# Patient Record
Sex: FEMALE | NOT HISPANIC OR LATINO | Employment: FULL TIME | URBAN - METROPOLITAN AREA
[De-identification: names, ages, dates, MRNs, and addresses within clinical notes are randomized per-mention and may not be internally consistent; named-entity substitution may affect disease eponyms.]

---

## 2018-04-19 ENCOUNTER — HOSPITAL ENCOUNTER (EMERGENCY)
Facility: HOSPITAL | Age: 58
Discharge: HOME | End: 2018-04-19
Attending: EMERGENCY MEDICINE | Admitting: EMERGENCY MEDICINE

## 2018-04-19 ENCOUNTER — APPOINTMENT (OUTPATIENT)
Dept: CT IMAGING | Facility: HOSPITAL | Age: 58
End: 2018-04-19

## 2018-04-19 VITALS
TEMPERATURE: 97.8 F | SYSTOLIC BLOOD PRESSURE: 147 MMHG | DIASTOLIC BLOOD PRESSURE: 83 MMHG | WEIGHT: 184 LBS | HEIGHT: 63 IN | RESPIRATION RATE: 15 BRPM | BODY MASS INDEX: 32.6 KG/M2 | HEART RATE: 90 BPM | OXYGEN SATURATION: 100 %

## 2018-04-19 DIAGNOSIS — S39.011A STRAIN OF ABDOMINAL WALL, INITIAL ENCOUNTER: Primary | ICD-10-CM

## 2018-04-19 DIAGNOSIS — N28.9 RENAL LESION: ICD-10-CM

## 2018-04-19 LAB
ALBUMIN SERPL-MCNC: 4.5 G/DL (ref 3.5–5.2)
ALBUMIN/GLOB SERPL: 1.5 G/DL
ALP SERPL-CCNC: 80 U/L (ref 39–117)
ALT SERPL W P-5'-P-CCNC: 14 U/L (ref 1–33)
ANION GAP SERPL CALCULATED.3IONS-SCNC: 11.8 MMOL/L
AST SERPL-CCNC: 18 U/L (ref 1–32)
BACTERIA UR QL AUTO: ABNORMAL /HPF
BASOPHILS # BLD AUTO: 0.04 10*3/MM3 (ref 0–0.2)
BASOPHILS NFR BLD AUTO: 0.8 % (ref 0–1.5)
BILIRUB SERPL-MCNC: 0.8 MG/DL (ref 0.1–1.2)
BILIRUB UR QL STRIP: NEGATIVE
BUN BLD-MCNC: 16 MG/DL (ref 6–20)
BUN/CREAT SERPL: 26.7 (ref 7–25)
CALCIUM SPEC-SCNC: 9 MG/DL (ref 8.6–10.5)
CHLORIDE SERPL-SCNC: 103 MMOL/L (ref 98–107)
CLARITY UR: CLEAR
CO2 SERPL-SCNC: 25.2 MMOL/L (ref 22–29)
COLOR UR: YELLOW
CREAT BLD-MCNC: 0.6 MG/DL (ref 0.57–1)
DEPRECATED RDW RBC AUTO: 44.4 FL (ref 37–54)
EOSINOPHIL # BLD AUTO: 0.03 10*3/MM3 (ref 0–0.7)
EOSINOPHIL NFR BLD AUTO: 0.6 % (ref 0.3–6.2)
ERYTHROCYTE [DISTWIDTH] IN BLOOD BY AUTOMATED COUNT: 13.4 % (ref 11.7–13)
GFR SERPL CREATININE-BSD FRML MDRD: 103 ML/MIN/1.73
GLOBULIN UR ELPH-MCNC: 3.1 GM/DL
GLUCOSE BLD-MCNC: 102 MG/DL (ref 65–99)
GLUCOSE UR STRIP-MCNC: NEGATIVE MG/DL
HCT VFR BLD AUTO: 43.1 % (ref 35.6–45.5)
HGB BLD-MCNC: 14.1 G/DL (ref 11.9–15.5)
HGB UR QL STRIP.AUTO: NEGATIVE
HYALINE CASTS UR QL AUTO: ABNORMAL /LPF
IMM GRANULOCYTES # BLD: 0.02 10*3/MM3 (ref 0–0.03)
IMM GRANULOCYTES NFR BLD: 0.4 % (ref 0–0.5)
KETONES UR QL STRIP: ABNORMAL
LEUKOCYTE ESTERASE UR QL STRIP.AUTO: ABNORMAL
LIPASE SERPL-CCNC: 32 U/L (ref 13–60)
LYMPHOCYTES # BLD AUTO: 1.09 10*3/MM3 (ref 0.9–4.8)
LYMPHOCYTES NFR BLD AUTO: 21.2 % (ref 19.6–45.3)
MCH RBC QN AUTO: 29.7 PG (ref 26.9–32)
MCHC RBC AUTO-ENTMCNC: 32.7 G/DL (ref 32.4–36.3)
MCV RBC AUTO: 90.9 FL (ref 80.5–98.2)
MONOCYTES # BLD AUTO: 0.37 10*3/MM3 (ref 0.2–1.2)
MONOCYTES NFR BLD AUTO: 7.2 % (ref 5–12)
NEUTROPHILS # BLD AUTO: 3.58 10*3/MM3 (ref 1.9–8.1)
NEUTROPHILS NFR BLD AUTO: 69.8 % (ref 42.7–76)
NITRITE UR QL STRIP: NEGATIVE
PH UR STRIP.AUTO: 7.5 [PH] (ref 5–8)
PLATELET # BLD AUTO: 329 10*3/MM3 (ref 140–500)
PMV BLD AUTO: 9.4 FL (ref 6–12)
POTASSIUM BLD-SCNC: 4 MMOL/L (ref 3.5–5.2)
PROT SERPL-MCNC: 7.6 G/DL (ref 6–8.5)
PROT UR QL STRIP: NEGATIVE
RBC # BLD AUTO: 4.74 10*6/MM3 (ref 3.9–5.2)
RBC # UR: ABNORMAL /HPF
REF LAB TEST METHOD: ABNORMAL
SODIUM BLD-SCNC: 140 MMOL/L (ref 136–145)
SP GR UR STRIP: 1.02 (ref 1–1.03)
SQUAMOUS #/AREA URNS HPF: ABNORMAL /HPF
UROBILINOGEN UR QL STRIP: ABNORMAL
WBC NRBC COR # BLD: 5.13 10*3/MM3 (ref 4.5–10.7)
WBC UR QL AUTO: ABNORMAL /HPF

## 2018-04-19 PROCEDURE — 25010000002 IOPAMIDOL 61 % SOLUTION: Performed by: EMERGENCY MEDICINE

## 2018-04-19 PROCEDURE — 96374 THER/PROPH/DIAG INJ IV PUSH: CPT

## 2018-04-19 PROCEDURE — 25010000002 MORPHINE PER 10 MG: Performed by: NURSE PRACTITIONER

## 2018-04-19 PROCEDURE — 80053 COMPREHEN METABOLIC PANEL: CPT | Performed by: NURSE PRACTITIONER

## 2018-04-19 PROCEDURE — 85025 COMPLETE CBC W/AUTO DIFF WBC: CPT | Performed by: NURSE PRACTITIONER

## 2018-04-19 PROCEDURE — 74177 CT ABD & PELVIS W/CONTRAST: CPT

## 2018-04-19 PROCEDURE — 99283 EMERGENCY DEPT VISIT LOW MDM: CPT

## 2018-04-19 PROCEDURE — 25010000002 ONDANSETRON PER 1 MG: Performed by: NURSE PRACTITIONER

## 2018-04-19 PROCEDURE — 96375 TX/PRO/DX INJ NEW DRUG ADDON: CPT

## 2018-04-19 PROCEDURE — 81001 URINALYSIS AUTO W/SCOPE: CPT | Performed by: NURSE PRACTITIONER

## 2018-04-19 PROCEDURE — 83690 ASSAY OF LIPASE: CPT | Performed by: NURSE PRACTITIONER

## 2018-04-19 RX ORDER — ONDANSETRON 2 MG/ML
4 INJECTION INTRAMUSCULAR; INTRAVENOUS ONCE
Status: COMPLETED | OUTPATIENT
Start: 2018-04-19 | End: 2018-04-19

## 2018-04-19 RX ORDER — SODIUM CHLORIDE 0.9 % (FLUSH) 0.9 %
10 SYRINGE (ML) INJECTION AS NEEDED
Status: DISCONTINUED | OUTPATIENT
Start: 2018-04-19 | End: 2018-04-19 | Stop reason: HOSPADM

## 2018-04-19 RX ADMIN — MORPHINE SULFATE 4 MG: 4 INJECTION, SOLUTION INTRAMUSCULAR; INTRAVENOUS at 12:10

## 2018-04-19 RX ADMIN — IOPAMIDOL 85 ML: 612 INJECTION, SOLUTION INTRAVENOUS at 12:54

## 2018-04-19 RX ADMIN — ONDANSETRON 4 MG: 2 INJECTION, SOLUTION INTRAMUSCULAR; INTRAVENOUS at 12:11

## 2018-04-19 NOTE — DISCHARGE INSTRUCTIONS
Ibuprofen as needed for pain  Rest, increase fluids  Follow up as per workman's comp if symptoms not improving  Return to er for fever, chills, vomiting, diarrhea, worsening pain or any new or worsening symptoms

## 2018-04-19 NOTE — ED PROVIDER NOTES
EMERGENCY DEPARTMENT ENCOUNTER    CHIEF COMPLAINT  Chief Complaint: ***  History given by: ***  History limited by: ***  Room Number: 34/34  PMD: No Known Provider      HPI:  Pt is a 57 y.o. female who presents complaining of ***    Duration/Onset/Timing: ***  Location: ***  Radiation: ***  Quality: ***  Intensity/Severity: ***  Associated Symptoms: ***  Aggravating or Alleviating Factors: ***  Previous Episodes: ***      PAST MEDICAL HISTORY  Active Ambulatory Problems     Diagnosis Date Noted   • No Active Ambulatory Problems     Resolved Ambulatory Problems     Diagnosis Date Noted   • No Resolved Ambulatory Problems     No Additional Past Medical History       PAST SURGICAL HISTORY  Past Surgical History:   Procedure Laterality Date   • RECTAL SURGERY         FAMILY HISTORY  History reviewed. No pertinent family history.    SOCIAL HISTORY  Social History     Social History   • Marital status:      Spouse name: N/A   • Number of children: N/A   • Years of education: N/A     Occupational History   • Not on file.     Social History Main Topics   • Smoking status: Never Smoker   • Smokeless tobacco: Never Used   • Alcohol use Yes      Comment: occ   • Drug use: No   • Sexual activity: No     Other Topics Concern   • Not on file     Social History Narrative   • No narrative on file       ALLERGIES  Penicillins    REVIEW OF SYSTEMS  Review of Systems    PHYSICAL EXAM  ED Triage Vitals   Temp Heart Rate Resp BP SpO2   04/19/18 1111 04/19/18 1111 04/19/18 1111 04/19/18 1122 04/19/18 1111   97.8 °F (36.6 °C) 90 15 159/93 100 %      Temp src Heart Rate Source Patient Position BP Location FiO2 (%)   04/19/18 1111 04/19/18 1111 04/19/18 1122 -- --   Tympanic Monitor Sitting         Physical Exam    LAB RESULTS  Lab Results (last 24 hours)     Procedure Component Value Units Date/Time    CBC & Differential [[id number]] Collected:  04/19/18 1158    Specimen:  Blood Updated:  04/19/18 1214    Narrative:       The  following orders were created for panel order CBC & Differential.  Procedure                               Abnormality         Status                     ---------                               -----------         ------                     CBC Auto Differential[[id number]]        Abnormal            Final result                 Please view results for these tests on the individual orders.    CBC Auto Differential [[id number]]  (Abnormal) Collected:  04/19/18 1158    Specimen:  Blood Updated:  04/19/18 1214     WBC 5.13 10*3/mm3      RBC 4.74 10*6/mm3      Hemoglobin 14.1 g/dL      Hematocrit 43.1 %      MCV 90.9 fL      MCH 29.7 pg      MCHC 32.7 g/dL      RDW 13.4 (H) %      RDW-SD 44.4 fl      MPV 9.4 fL      Platelets 329 10*3/mm3      Neutrophil % 69.8 %      Lymphocyte % 21.2 %      Monocyte % 7.2 %      Eosinophil % 0.6 %      Basophil % 0.8 %      Immature Grans % 0.4 %      Neutrophils, Absolute 3.58 10*3/mm3      Lymphocytes, Absolute 1.09 10*3/mm3      Monocytes, Absolute 0.37 10*3/mm3      Eosinophils, Absolute 0.03 10*3/mm3      Basophils, Absolute 0.04 10*3/mm3      Immature Grans, Absolute 0.02 10*3/mm3     Comprehensive Metabolic Panel [[id number]] Collected:  04/19/18 1159    Specimen:  Blood Updated:  04/19/18 1205    Lipase [[id number]] Collected:  04/19/18 1159    Specimen:  Blood Updated:  04/19/18 1205          I ordered the above labs and reviewed the results    RADIOLOGY  CT Abdomen Pelvis With Contrast    (Results Pending)        I ordered the above noted radiological studies. Interpreted by radiologist. Discussed with radiologist (***). Reviewed by me in PACS.       PROCEDURES  Procedures      PROGRESS AND CONSULTS  ED Course           MEDICAL DECISION MAKING  Results were reviewed/discussed with the patient and they were also made aware of online access. Pt also made aware that some labs, such as cultures, will not be resulted during ER visit and follow up with PMD is necessary.     MDM        DIAGNOSIS  Final diagnoses:   None       DISPOSITION  ***    Latest Documented Vital Signs:  As of 12:30 PM  BP- 159/93 HR- 90 Temp- 97.8 °F (36.6 °C) (Tympanic) O2 sat- 100%    --  Documentation assistance provided by scribe *** for ***.  Information recorded by the scribe was done at my direction and has been verified and validated by me.     Ga Guadarrama  04/19/18 5419

## 2018-04-19 NOTE — ED TRIAGE NOTES
Patient presents to ER via private vehicle from home.  Reports an occupational injury on 04/13.  Seen at [facility] and sent to ER for evaluation.  Patient reports lifting a heavy object and abrupt onset of left lower abdominal pain that is not affected by a cough.

## 2018-04-19 NOTE — ED PROVIDER NOTES
The JOSUE and I have discussed this patient's history, physical exam, and treatment plan. I have reviewed the documentation and personally had a face to face interaction with the patient  I affirm the documentation and agree with the treatment and plan.  The following describes my personal findings.    The patient presents complaining of LLQ abd pain after pushing a heavy cart at work 6 days ago. She reports her pain is worse with movement. She denies N/V, fever.      Limited physical exam:  Patient is nontoxic appearing  Lungs/cardiovascular: lungs CTAB, HRRR, no murmurs  Abdomen: Pt gestures to LLQ for pain, but there is no bulge/TTPalp. Back/extremities: PT and DP pulses intact. No edema    Labs and imaging reviewed.     Documentation assistance provided by asif Guadarrama.  Information recorded by the scribe was done at my direction and has been verified and validated by me.         Ga Guadarrama  04/19/18 9475       Marilyn Payne MD  04/21/18 5276

## 2018-04-19 NOTE — ED PROVIDER NOTES
EMERGENCY DEPARTMENT ENCOUNTER    CHIEF COMPLAINT  Chief Complaint: abdominal injury  History given by: patient, family  History limited by: N/A  Room Number: 34/34  PMD: No Known Provider      HPI:  Pt is a 57 y.o. female who presents with abdominal injury. She reports that about 6 days ago (on 4/13/18), while she was pushing a heavy object at work, she had onset of constant left lower abd pain that radiates to the left pelvis. It is exacerbated by walking, bending over, and movement and is alleviated by sitting down and remaining still. She denies LLE sensory loss, LLE motor loss, back pain, bladder dysfunction, bowel dysfunction, saddle anesthesia, pain and difficulty with urination, any other urinary sx, N/V/D, fevers, chills, and sustaining any other injury. She reports that she was seen at [facility] earlier today and was referred to ED for further evaluation. No significant Past Medical History.     Duration: occurred about 6 days ago  Timing: constant  Location: left lower abdomen   Radiation: left pelvis   Quality: pain  Intensity/Severity: moderate  Progression: unchanged  Associated Symptoms: none  Aggravating Factors: walking, bending over, movement  Alleviating Factors: sitting down, remaining still  Previous Episodes: none  Treatment before arrival: none mentioned    PAST MEDICAL HISTORY  Active Ambulatory Problems     Diagnosis Date Noted   • No Active Ambulatory Problems     Resolved Ambulatory Problems     Diagnosis Date Noted   • No Resolved Ambulatory Problems     No Additional Past Medical History       PAST SURGICAL HISTORY  Past Surgical History:   Procedure Laterality Date   • RECTAL SURGERY         FAMILY HISTORY  History reviewed. No pertinent family history.    SOCIAL HISTORY  Social History     Social History   • Marital status:      Spouse name: N/A   • Number of children: N/A   • Years of education: N/A     Occupational History   • Not on file.     Social History Main Topics    • Smoking status: Never Smoker   • Smokeless tobacco: Never Used   • Alcohol use Yes      Comment: occ   • Drug use: No   • Sexual activity: No     Other Topics Concern   • Not on file     Social History Narrative   • No narrative on file         ALLERGIES  Penicillins    REVIEW OF SYSTEMS  Review of Systems   Constitutional: Negative for chills and fever.   HENT: Negative for sore throat.    Respiratory: Negative for cough and shortness of breath.    Cardiovascular: Negative for chest pain.   Gastrointestinal: Positive for abdominal pain (left lower abd pain radiating to left pelvis). Negative for diarrhea, nausea and vomiting.   Genitourinary: Positive for pelvic pain (left lower abd pain radiating to left pelvis). Negative for difficulty urinating and dysuria.   Musculoskeletal: Negative for back pain.   Skin: Negative for rash.   Neurological: Negative for dizziness, weakness and numbness.   Psychiatric/Behavioral: The patient is not nervous/anxious.        PHYSICAL EXAM  ED Triage Vitals   Temp Heart Rate Resp BP SpO2   04/19/18 1111 04/19/18 1111 04/19/18 1111 04/19/18 1122 04/19/18 1111   97.8 °F (36.6 °C) 90 15 159/93 100 % WNL     Physical Exam   Constitutional: She is oriented to person, place, and time and well-developed, well-nourished, and in no distress.   HENT:   Head: Normocephalic.   Mouth/Throat: Mucous membranes are normal.   Eyes: EOM are normal. No scleral icterus.   Neck: Normal range of motion.   Cardiovascular: Normal rate, regular rhythm and normal heart sounds.    Pulmonary/Chest: Effort normal and breath sounds normal. No respiratory distress.   Abdominal: Soft. Bowel sounds are normal. There is tenderness in the left lower quadrant. There is no rebound and no guarding.   No protrusion to abdomen   Musculoskeletal: Normal range of motion.   Back is nontender   Neurological: She is alert and oriented to person, place, and time. She has normal motor skills and normal sensation.   Skin:  Skin is warm and dry.   Psychiatric: Mood and affect normal.   Nursing note and vitals reviewed.      LAB RESULTS  Recent Results (from the past 24 hour(s))   CBC Auto Differential    Collection Time: 04/19/18 11:58 AM   Result Value Ref Range    WBC 5.13 4.50 - 10.70 10*3/mm3    RBC 4.74 3.90 - 5.20 10*6/mm3    Hemoglobin 14.1 11.9 - 15.5 g/dL    Hematocrit 43.1 35.6 - 45.5 %    MCV 90.9 80.5 - 98.2 fL    MCH 29.7 26.9 - 32.0 pg    MCHC 32.7 32.4 - 36.3 g/dL    RDW 13.4 (H) 11.7 - 13.0 %    RDW-SD 44.4 37.0 - 54.0 fl    MPV 9.4 6.0 - 12.0 fL    Platelets 329 140 - 500 10*3/mm3    Neutrophil % 69.8 42.7 - 76.0 %    Lymphocyte % 21.2 19.6 - 45.3 %    Monocyte % 7.2 5.0 - 12.0 %    Eosinophil % 0.6 0.3 - 6.2 %    Basophil % 0.8 0.0 - 1.5 %    Immature Grans % 0.4 0.0 - 0.5 %    Neutrophils, Absolute 3.58 1.90 - 8.10 10*3/mm3    Lymphocytes, Absolute 1.09 0.90 - 4.80 10*3/mm3    Monocytes, Absolute 0.37 0.20 - 1.20 10*3/mm3    Eosinophils, Absolute 0.03 0.00 - 0.70 10*3/mm3    Basophils, Absolute 0.04 0.00 - 0.20 10*3/mm3    Immature Grans, Absolute 0.02 0.00 - 0.03 10*3/mm3   Comprehensive Metabolic Panel    Collection Time: 04/19/18 11:59 AM   Result Value Ref Range    Glucose 102 (H) 65 - 99 mg/dL    BUN 16 6 - 20 mg/dL    Creatinine 0.60 0.57 - 1.00 mg/dL    Sodium 140 136 - 145 mmol/L    Potassium 4.0 3.5 - 5.2 mmol/L    Chloride 103 98 - 107 mmol/L    CO2 25.2 22.0 - 29.0 mmol/L    Calcium 9.0 8.6 - 10.5 mg/dL    Total Protein 7.6 6.0 - 8.5 g/dL    Albumin 4.50 3.50 - 5.20 g/dL    ALT (SGPT) 14 1 - 33 U/L    AST (SGOT) 18 1 - 32 U/L    Alkaline Phosphatase 80 39 - 117 U/L    Total Bilirubin 0.8 0.1 - 1.2 mg/dL    eGFR Non African Amer 103 >60 mL/min/1.73    Globulin 3.1 gm/dL    A/G Ratio 1.5 g/dL    BUN/Creatinine Ratio 26.7 (H) 7.0 - 25.0    Anion Gap 11.8 mmol/L   Lipase    Collection Time: 04/19/18 11:59 AM   Result Value Ref Range    Lipase 32 13 - 60 U/L   Urinalysis With / Microscopic If Indicated - Urine,  Clean Catch    Collection Time: 04/19/18 12:40 PM   Result Value Ref Range    Color, UA Yellow Yellow, Straw    Appearance, UA Clear Clear    pH, UA 7.5 5.0 - 8.0    Specific Gravity, UA 1.016 1.005 - 1.030    Glucose, UA Negative Negative    Ketones, UA 15 mg/dL (1+) (A) Negative    Bilirubin, UA Negative Negative    Blood, UA Negative Negative    Protein, UA Negative Negative    Leuk Esterase, UA Moderate (2+) (A) Negative    Nitrite, UA Negative Negative    Urobilinogen, UA 0.2 E.U./dL 0.2 - 1.0 E.U./dL   Urinalysis, Microscopic Only - Urine, Clean Catch    Collection Time: 04/19/18 12:40 PM   Result Value Ref Range    RBC, UA 0-2 None Seen, 0-2 /HPF    WBC, UA 3-5 (A) None Seen, 0-2 /HPF    Bacteria, UA 4+ (A) None Seen /HPF    Squamous Epithelial Cells, UA 7-12 (A) None Seen, 0-2 /HPF    Hyaline Casts, UA 3-6 None Seen /LPF    Methodology Automated Microscopy        I ordered the above labs and reviewed the results    RADIOLOGY  CT Abdomen Pelvis With Contrast (Preliminary result) Result time: 04/19/18 13:27:20   Preliminary result by Ayanna Wilburn MD (04/19/18 13:27:20)   Impression:   1. There is mild diverticulosis at the descending and sigmoid colon  without convincing evidence for diverticulitis.  2. Follow-up is recommended for an 8 mm hyperdense right renal lesion.  The lesion may represent a proteinaceous cyst, but a solid enhancing  mass cannot be entirely excluded. Renal mass protocol CT of the abdomen  is recommended in 6 months.     Discussed with ARTHUR Lawrence.      Narrative:   CT ABDOMEN AND PELVIS WITH IV CONTRAST     HISTORY: 57-year-old female with left lower quadrant pain.     TECHNIQUE: Radiation dose reduction techniques were utilized, including  automated exposure control and exposure modulation based on body size.   3 mm images were obtained through the abdomen and pelvis after the  administration of IV contrast. There is no previous CT for comparison.     FINDINGS: The liver  appears unremarkable other than 2 cysts with the  larger measuring 2 cm. The gallbladder appears unremarkable and there is  no biliary dilatation. Splenic size is normal. The pancreas, adrenals,  and left kidney appear unremarkable. There is a slightly hyperdense 8 mm  partially exophytic lesion at the upper pole of the right kidney, image  51. The right kidney appears otherwise unremarkable. There is mild  diverticulosis throughout the descending and sigmoid colon. There is no  evidence for diverticulitis. The appendix appears normal. Uterus and  adnexa appear unremarkable. There is no free fluid or lymphadenopathy.       I ordered the above noted radiological studies and reviewed the images on the PACS system.  Spoke with Dr. Wilburn (radiologist) regarding CT abd/pel scan results        PROGRESS AND CONSULTS  1138- Ordered morphine and zofran for pain. Ordered CT abd/pel, lipase, blood work, and UA for further evaluation.   1227- Reviewed pt's history and workup with Dr. Payne.  At bedside evaluation, they agree with the plan of care.  1305- Obtained CT abd/pel results-> diverticulosis, no diverticulitis, 8mm hyperdense right renal lesion, no acute intraabdominal injury.   1342- Rechecked pt. She is resting comfortably and is in no acute distress. She states that her pain has improved after ED treatment. Reviewed implications of results (including normal WBC count, equivocal UA for UTI, CT abd/pel findings (diverticulosis, no diverticulitis, 8mm hyperdense right renal lesion, no acute intraabdominal injury)), diagnosis, meds, responsibility to follow up, warning signs and symptoms of possible worsening, potential complications and reasons to return to ER with patient.  Discussed all results and noted any abnormalities with patient.  Discussed the importance of and the absolute need to follow up with referred [facility] physician for recheck of condition and reevaluation of abnormalities (including right renal lesion seen  "on CT abd/pel (recommended repeat CT abd/pel in 6 months)). Informed pt that her sx could possibly be due to abd wall strain. Advised pt to take ibuprofen for pain, to rest, and to well hydrate.  Discussed plan for discharge, as there is no emergent indication for admission.  Pt is agreeable and understands need for follow up and repeat testing.  Pt is aware that discharge does not mean that nothing is wrong but it indicates no emergency is present.  Pt is discharged with instructions to follow up with primary care doctor to have their blood pressure rechecked.   1344- Urine sample for UA appears contaminated and equivocal for UTI. Pt denies any urinary sx. Will await urine culture results and then treat accordingly.       DIAGNOSIS  Final diagnoses:   Strain of abdominal wall, initial encounter   Renal lesion (right kidney)       FOLLOW UP   PATIENT LIAISON [facility] [id number] [phone]  Schedule an appointment as soon as possible for a visit   If symptoms worsen      COURSE & MEDICAL DECISION MAKING  Pertinent Labs and Imaging studies that were ordered and reviewed are noted above.  Results were reviewed/discussed with the patient and family and they were also made aware of online assess.   Pt and family also made aware that some labs, such as cultures, will not be resulted during ER visit and follow up with PMD is necessary.     MEDICATIONS GIVEN IN ER  Medications   sodium chloride 0.9 % flush 10 mL (not administered)   morphine injection 4 mg (4 mg Intravenous Given 4/19/18 1210)   ondansetron (ZOFRAN) injection 4 mg (4 mg Intravenous Given 4/19/18 1211)   iopamidol (ISOVUE-300) 61 % injection 100 mL (85 mL Intravenous Given 4/19/18 1254)       /83   Pulse 90   Temp 97.8 °F (36.6 °C) (Tympanic)   Resp 15   Ht 160 cm (63\")   Wt 83.5 kg (184 lb)   SpO2 100%   BMI 32.59 kg/m²       I personally reviewed the past medical history, past surgical history, social history, family " history, current medications and allergies as they appear in this chart.  The scribe's note accurately reflects the work and decisions made by me.     Documentation assistance provided by asif White for JOSE F Lawrence on 4/19/2018 at 1:59 PM. Information recorded by the scribe was done at my direction and has been verified and validated by me.       Lili White  04/19/18 1410       ARTHUR Toussaint  04/19/18 1417

## 2019-01-10 ENCOUNTER — HOSPITAL ENCOUNTER (OUTPATIENT)
Dept: OTHER | Facility: HOSPITAL | Age: 59
Discharge: HOME | End: 2019-01-10
Attending: NURSE PRACTITIONER

## 2019-01-10 LAB
CHOLEST SERPL-MCNC: 207 MG/DL (ref 107–200)
CHOLEST/HDLC SERPL: 2.9 {RATIO} (ref 3–6)
GLUCOSE SERPL-MCNC: 103 MG/DL (ref 65–115)
HDLC SERPL-MCNC: 71 MG/DL (ref 40–60)
LDLC SERPL CALC-MCNC: 125 MG/DL (ref 70–100)
TRIGL SERPL-MCNC: 53 MG/DL (ref 40–150)
VLDLC SERPL-MCNC: 11 MG/DL (ref 5–37)

## 2019-01-11 LAB
EST. AVERAGE GLUCOSE BLD GHB EST-MCNC: 111 MG/DL
HBA1C MFR BLD: 5.5 % (ref 3.5–5.7)

## 2019-01-16 ENCOUNTER — HOSPITAL ENCOUNTER (OUTPATIENT)
Dept: OTHER | Facility: HOSPITAL | Age: 59
Discharge: HOME | End: 2019-01-16
Attending: NURSE PRACTITIONER

## 2019-01-16 ENCOUNTER — OFFICE VISIT CONVERTED (OUTPATIENT)
Dept: FAMILY MEDICINE CLINIC | Age: 59
End: 2019-01-16
Attending: NURSE PRACTITIONER

## 2019-01-16 LAB
ALBUMIN SERPL-MCNC: 4.3 G/DL (ref 3.5–5)
ALBUMIN/GLOB SERPL: 1.4 {RATIO} (ref 1.4–2.6)
ALP SERPL-CCNC: 87 U/L (ref 53–141)
ALT SERPL-CCNC: 10 U/L (ref 10–40)
AMYLASE SERPL-CCNC: 58 U/L (ref 30–110)
ANION GAP SERPL CALC-SCNC: 19 MMOL/L (ref 8–19)
APPEARANCE UR: CLEAR
AST SERPL-CCNC: 13 U/L (ref 15–50)
BILIRUB SERPL-MCNC: 0.39 MG/DL (ref 0.2–1.3)
BILIRUB UR QL: NEGATIVE
BUN SERPL-MCNC: 17 MG/DL (ref 5–25)
BUN/CREAT SERPL: 26 {RATIO} (ref 6–20)
CALCIUM SERPL-MCNC: 9.3 MG/DL (ref 8.7–10.4)
CHLORIDE SERPL-SCNC: 102 MMOL/L (ref 99–111)
COLOR UR: YELLOW
CONV BACTERIA: NEGATIVE
CONV CO2: 24 MMOL/L (ref 22–32)
CONV COLLECTION SOURCE (UA): ABNORMAL
CONV TOTAL PROTEIN: 7.3 G/DL (ref 6.3–8.2)
CONV UROBILINOGEN IN URINE BY AUTOMATED TEST STRIP: 0.2 {EHRLICHU}/DL (ref 0.1–1)
CREAT UR-MCNC: 0.65 MG/DL (ref 0.5–0.9)
ERYTHROCYTE [DISTWIDTH] IN BLOOD BY AUTOMATED COUNT: 11.7 % (ref 11.5–14.5)
GFR SERPLBLD BASED ON 1.73 SQ M-ARVRAT: >60 ML/MIN/{1.73_M2}
GLOBULIN UR ELPH-MCNC: 3 G/DL (ref 2–3.5)
GLUCOSE SERPL-MCNC: 98 MG/DL (ref 65–99)
GLUCOSE UR QL: NEGATIVE MG/DL
HBA1C MFR BLD: 13.2 G/DL (ref 12–16)
HCT VFR BLD AUTO: 39.2 % (ref 37–47)
HGB UR QL STRIP: ABNORMAL
KETONES UR QL STRIP: NEGATIVE MG/DL
LEUKOCYTE ESTERASE UR QL STRIP: NEGATIVE
LIPASE SERPL-CCNC: 31 U/L (ref 5–51)
MCH RBC QN AUTO: 29.7 PG (ref 27–31)
MCHC RBC AUTO-ENTMCNC: 33.7 G/DL (ref 33–37)
MCV RBC AUTO: 88.1 FL (ref 81–99)
NITRITE UR QL STRIP: NEGATIVE
OSMOLALITY SERPL CALC.SUM OF ELEC: 294 MOSM/KG (ref 273–304)
PH UR STRIP.AUTO: 6.5 [PH] (ref 5–8)
PLATELET # BLD AUTO: 363 10*3/UL (ref 130–400)
PMV BLD AUTO: 6.5 FL (ref 7.4–10.4)
POTASSIUM SERPL-SCNC: 4 MMOL/L (ref 3.5–5.3)
PROT UR QL: NEGATIVE MG/DL
RBC # BLD AUTO: 4.44 10*6/UL (ref 4.2–5.4)
RBC #/AREA URNS HPF: ABNORMAL /[HPF]
SODIUM SERPL-SCNC: 141 MMOL/L (ref 135–147)
SP GR UR: 1.02 (ref 1–1.03)
SQUAMOUS SPT QL MICRO: ABNORMAL /[HPF]
WBC # BLD AUTO: 7.32 10*3/UL (ref 4.8–10.8)
WBC #/AREA URNS HPF: ABNORMAL /[HPF]

## 2019-11-21 ENCOUNTER — HOSPITAL ENCOUNTER (OUTPATIENT)
Dept: OTHER | Facility: HOSPITAL | Age: 59
Discharge: HOME | End: 2019-11-21
Attending: FAMILY MEDICINE

## 2019-11-21 LAB
CHOLEST SERPL-MCNC: 191 MG/DL (ref 107–200)
CHOLEST/HDLC SERPL: 3.2 {RATIO} (ref 3–6)
GLUCOSE SERPL-MCNC: 95 MG/DL (ref 65–115)
HDLC SERPL-MCNC: 59 MG/DL (ref 40–60)
LDLC SERPL CALC-MCNC: 123 MG/DL (ref 70–100)
TRIGL SERPL-MCNC: 46 MG/DL (ref 40–150)
VLDLC SERPL-MCNC: 9 MG/DL (ref 5–37)

## 2020-01-22 ENCOUNTER — OFFICE VISIT CONVERTED (OUTPATIENT)
Dept: FAMILY MEDICINE CLINIC | Age: 60
End: 2020-01-22
Attending: FAMILY MEDICINE

## 2020-06-26 ENCOUNTER — OFFICE VISIT CONVERTED (OUTPATIENT)
Dept: FAMILY MEDICINE CLINIC | Age: 60
End: 2020-06-26
Attending: NURSE PRACTITIONER

## 2021-05-18 NOTE — PROGRESS NOTES
Mavis Andrews 1960     Office/Outpatient Visit    Visit Date: Wed, Jan 16, 2019 04:31 pm    Provider: Lily Briggs N.P. (Assistant: Sheri Brooks RN)    Location: [facility]        Electronically signed by Lily Briggs N.P. on  01/16/2019 05:25:38 PM                             SUBJECTIVE:        CC:     Ms. Andrews is a 58 year old White female.  Preventative Exam for work;         HPI:         Patient to be evaluated for health checkup.  Her last menstrual period was 11/22/2018.  She is not currently using any form of contraception.  She is current with her Td and influenza immunization.  Tobacco: She has never smoked.          PHQ-9 Depression Screening: Completed form scanned and in chart; Total Score 1 Alcohol Consumption Screening: Completed form scanned and in chart; Total Score 2         Abdominal pain, other specified site details; this is located primarily in the right lower quadrant and periumbilical area.  It began yesterday.  She characterizes it as aching and sharp.  She estimates that the frequency of pain is waxes and wanes.  Associated symptoms include nausea.  She denies, constipation, diarrhea, dysuria, fever or vomiting.      ROS:     CONSTITUTIONAL:  Negative for chills and fever.      EYES:  Negative for eye drainage and eye pain.      E/N/T:  Negative for ear pain and sore throat.      CARDIOVASCULAR:  Negative for chest pain and palpitations.      RESPIRATORY:  Negative for dyspnea and frequent wheezing.      GASTROINTESTINAL:  Positive for abdominal pain and nausea.   Negative for constipation, diarrhea or vomiting.      GENITOURINARY:  Negative for dysuria and frequent urination.      MUSCULOSKELETAL:  Negative for joint stiffness and myalgias.      INTEGUMENTARY/BREAST:  Negative for rash.      NEUROLOGICAL:  Negative for dizziness and headaches.      ENDOCRINE:  Negative for polydipsia and polyphagia.      PSYCHIATRIC:  Negative for depression and suicidal thoughts.           PMH/FMH/SH:     Last Reviewed on 2017 10:34 AM by Lily Briggs    Past Medical History:                 PAST MEDICAL HISTORY     UNREMARKABLE         PREVENTIVE HEALTH MAINTENANCE             COLORECTAL CANCER SCREENING: Up to date (colonoscopy q10y; sigmoidoscopy q5y; Cologuard q3y) was last done 2016; colonoscopy with normal results     MAMMOGRAM: was last done 2017 with normal results     PAP SMEAR: was last done 2017 with normal results         Surgical History:         rectocele;         Family History:     Father: ;  Coronary Artery Disease; Hypertension; CABG X 3     Mother: ;  Renal Failure ( on dialysis );  Type 2 Diabetes     Brother(s): Colon Cancer         Social History:     Occupation: [employer]     Marital Status:      Children: 1 child         Tobacco/Alcohol/Supplements:     Last Reviewed on 2017 12:22 PM by Yolie Sharif    Tobacco: She has never smoked.          Alcohol: Frequency: Just on weekends When she drinks, the average quantity of alcohol is 1-2 drinks.          Substance Abuse History:     Last Reviewed on 2017 10:34 AM by Lily Briggs        Mental Health History:     Last Reviewed on 2017 10:34 AM by Lily Briggs        Communicable Diseases (eg STDs):     Last Reviewed on 2017 10:34 AM by Lily Briggs            Current Problems:     Last Reviewed on 2017 10:34 AM by Lily Briggs    Screening for diabetes mellitus     Screening for lipoid disorders         Immunizations:     Fluzone (3 + years dose) 2016     Fluzone (3 + years dose) 10/2/2017     Adacel (Tdap) 2017         Allergies:     Last Reviewed on 2017 12:22 PM by Yolie Sharif    Penicillins:    Erythromycin Base:        Current Medications:     Last Reviewed on 2017 12:22 PM by Yolie Sharif    Advil 200mg Tablet 1 tab daily prn     Benadryl Allergy 25mg Capsules 1 tab daily PRN     Tylenol Extra Strength 500mg Tablet 1 tab daily PRN          OBJECTIVE:        Vitals:         Current: 1/16/2019 4:36:24 PM    Ht:  5 ft, 2.75 in;  Wt: 193 lbs;  WC: 41 inches;  BMI: 34.5    T: 97.5 F (oral);  BP: 136/80 mm Hg (right arm, sitting);  P: 78 bpm (right arm (BP Cuff), sitting)        Exams:     PHYSICAL EXAM:     GENERAL: well developed, well nourished;  no apparent distress;     EYES: PERRL, EOMI     E/N/T: EARS: external auditory canal normal;  bilateral TMs are normal;  NOSE: normal turbinates; no sinus tenderness; OROPHARYNX: oral mucosa is normal; posterior pharynx shows no exudate;     NECK: range of motion is normal; trachea is midline;     RESPIRATORY: normal respiratory rate and pattern with no distress; normal breath sounds with no rales, rhonchi, wheezes or rubs;     CARDIOVASCULAR: normal rate; rhythm is regular;     GASTROINTESTINAL: mild periumbilical tenderness;  normal bowel sounds; no organomegaly;     MUSCULOSKELETAL: normal gait; normal range of motion of all major muscle groups; no limb or joint pain with range of motion;     NEUROLOGIC: mental status: alert and oriented x 3; GROSSLY INTACT     PSYCHIATRIC: appropriate affect and demeanor;         ASSESSMENT           V70.0   Z00.00  Health checkup              DDx:     V79.0   Z13.89  Screening for depression              DDx:     789.09   R10.33  Abdominal pain, other specified site              DDx:         ORDERS:         Meds Prescribed:       Zofran ODT (Ondansetron HCl) 4mg Tablets, Orally Disintegrating 1 tab every 8 hours PRN for nausea/vomiting  #20 (Twenty) tablet(s) Refills: 0         Lab Orders:       [id number]  AMYS - [facility] Amylase, Serum  (Send-Out)         71634  BDCB2 - [facility] CBC w/o diff  (Send-Out)         50702  COMP - [facility] Comp. Metabolic Panel  (Send-Out)         94372  LIP - [facility] Lipase, Serum  (Send-Out)         23785  BDUAM - [facility] Urinalysis, automated, with micro  (Send-Out)           Other Orders:         Calculated BMI above the upper parameter and a follow-up plan  was documented in the medical record  (In-House)           Depression screen negative  (In-House)           Negative EtOH screen  (In-House)                   PLAN:          Health checkup Lab results reviewed with patient. Some elevation to cholesterol but low ASCVD risk score. Discussed heart healthy diet and regular exercise.         COUNSELING provided on: healthy eating habits and regular exercise.  MIPS Vaccines Flu and Pneumonia updated in Shot record     BMI Elevated - Follow-Up Plan: She was provided education on weight loss strategies     FOLLOW-UP: Schedule follow-up appointments on a p.r.n. basis. for Annual Checkup           Orders:         Calculated BMI above the upper parameter and a follow-up plan was documented in the medical record  (In-House)            Screening for depression     MIPS PHQ-9 Depression Screening Completed form scanned and in chart; Total Score 1 Negative alcohol screen           Orders:         Depression screen negative  (In-House)           Negative EtOH screen  (In-House)            Abdominal pain, other specified site No acute abdomen. Further recommendations to follow after labs. Advised to seek ER care immediately for worsening symptoms such as severe pain, coffee ground emesis, black tarry stools.     LABORATORY:  Labs ordered to be performed today include amylase, CBC W/O DIFF, Comprehensive metabolic panel, Lipase, and urinalysis with micro.      FOLLOW-UP: Schedule follow-up appointments on a p.r.n. basis. for after testing           Prescriptions:       Zofran ODT (Ondansetron HCl) 4mg Tablets, Orally Disintegrating 1 tab every 8 hours PRN for nausea/vomiting  #20 (Twenty) tablet(s) Refills: 0           Orders:       44561  AMYS - [facility] Amylase, Serum  (Send-Out)         82246  BDCB2 - [facility] CBC w/o diff  (Send-Out)         59495  COMP - [facility] Comp. Metabolic Panel  (Send-Out)         64000  LIP - [facility] Lipase, Serum  (Send-Out)         16479  BDUAM -  [facility] Urinalysis, automated, with micro  (Send-Out)               Patient Recommendations:        For  Health checkup:         Limit dietary intake of fat (especially saturated fat) and cholesterol.  Eat a variety of foods, including plenty of fruits, vegetables, and grain containg fiber, limit fat intake to 30% of total calories. Balance caloric intake with energy expended.    Maintaining regular physical activity is advised to help prevent heart disease, hypertension, diabetes, and obesity.  Schedule follow-up appointments as needed.          For  Abdominal pain, other specified site:     Schedule follow-up appointments as needed.              CHARGE CAPTURE           **Please note: ICD descriptions below are intended for billing purposes only and may not represent clinical diagnoses**        Primary Diagnosis:         V70.0 Health checkup            Z00.00    Encounter for general adult medical examination without abnormal findings              Orders:          49476   Preventive medicine, established patient, age 40-64 years  (In-House)                Calculated BMI above the upper parameter and a follow-up plan was documented in the medical record  (In-House)           V79.0 Screening for depression            Z13.89    Encounter for screening for other disorder              Orders:          43756 -25  Office/outpatient visit; established patient, level 3  (In-House)                Depression screen negative  (In-House)                Negative EtOH screen  (In-House)           789.09 Abdominal pain, other specified site            R10.33    Periumbilical pain

## 2021-05-18 NOTE — PROGRESS NOTES
Mavis Andrews  1960     Office/Outpatient Visit    Visit Date: Fri, Jun 26, 2020 08:27 am    Provider: Lily Briggs N.P. (Assistant: Sarah Beth MA)    Location: [facility]        Electronically signed by Lily Briggs N.P. on  06/26/2020 09:15:35 AM                             Subjective:        CC: Ms. Andrews is a 59 year old White female.  Patient presents today to discuss anxiety issues, also has complaints of tick bite around right rib area X 4 days;         HPI:           Ms. Andrews presents with anxiety disorder, unspecified.  Her symptom complex includes apprehension and crying spells.  The frequency symptoms is several times per week.  Apparent triggers include occupational stressors, she is still laid off and her insurance is being taken away, and her brother is being taken off life support today.  She is not currently being treated for anxiety.  Previous attempts at treatment have included Xanax for short term after prior situational stressors.  Also tried Prozac in the past but did not tolerate. Additionally, she removed a small tick from under her right breast and now itching and red and would like to have looked at.     ROS:     CONSTITUTIONAL:  Negative for chills and fever.      CARDIOVASCULAR:  Negative for chest pain and palpitations.      RESPIRATORY:  Negative for dyspnea and frequent wheezing.      GASTROINTESTINAL:  Negative for abdominal pain and vomiting.      INTEGUMENTARY/BREAST:  Positive for rash.      NEUROLOGICAL:  Negative for dizziness and headaches.      PSYCHIATRIC:  Positive for anxiety and feelings of stress.   Negative for suicidal thoughts.          Past Medical History / Family History / Social History:         Last Reviewed on 6/26/2020 08:39 AM by Lily Briggs    Past Medical History:                 PAST MEDICAL HISTORY         Osteopenia         PREVENTIVE HEALTH MAINTENANCE             BONE DENSITY: was last done 8/2/18 with the following  abnormality noted-- Osteopenia     COLORECTAL CANCER SCREENING: Up to date (colonoscopy q10y; sigmoidoscopy q5y; Cologuard q3y) was last done 7/15/16, Results are in chart; colonoscopy with the following abnormalities noted-- Diverticulosis; The next colonoscopy is due  ; Dr. Hood     MAMMOGRAM: Done within last 2 years and results in are chart was last done  with normal results ARTHUR Knox     PAP SMEAR: was last done  with normal results         Surgical History:         rectocele;         Family History:     Father: ;  Coronary Artery Disease; Hypertension; CABG X 3     Mother: ;  Renal Failure ( on dialysis );  Type 2 Diabetes     Brother(s): Colon Cancer         Social History:     Occupation: [employer] shift     Marital Status:      Children: 1 child         Tobacco/Alcohol/Supplements:     Last Reviewed on 2020 08:29 AM by Sarah Beth    Tobacco: She has never smoked.          Alcohol: Frequency: Just on weekends When she drinks, the average quantity of alcohol is 1-2 drinks.          Substance Abuse History:     Last Reviewed on 2020 08:23 PM by Shahid Cervantes    None         Mental Health History:     Last Reviewed on 2020 08:23 PM by Shahid Cervantes    NEGATIVE         Communicable Diseases (eg STDs):     Last Reviewed on 2020 08:23 PM by Shahid Cervantes    Reportable health conditions; NEGATIVE         Current Problems:     Last Reviewed on 2020 08:23 PM by Shahid Cervantes    Encounter for screening for diabetes mellitus    Encounter for screening for lipoid disorders    Encounter for screening for depression    Encounter for general adult medical examination without abnormal findings        Immunizations:     influenza, injectable, quadrivalent, preservative free 10/14/2019    Fluzone (3 + years dose) 2016    Fluzone (3 + years dose) 10/2/2017    Fluzone (3 + years dose) 10/1/2018    Adacel (Tdap) 2017        Allergies:      Last Reviewed on 6/26/2020 08:29 AM by Sarah Beth    Penicillins:      Erythromycin Base:          Current Medications:     Last Reviewed on 6/26/2020 08:29 AM by Sarah Beth    diclofenac sodium 75 mg oral tablet, delayed release (enteric coated) [take 1 tablet (75 mg) by oral route 1 times per day]    BenadryL 25 mg oral capsule [1 tab daily PRN]    Tylenol Extra Strength 500 mg oral tablet [1 tab daily PRN]        Objective:        Vitals:         Historical:     1/22/2020  BP:   132/72 mm Hg ( (right arm, , sitting, );) 1/22/2020  P:   76bpm ( (right arm (BP Cuff), , sitting, );) 1/22/2020  T:   98F ( (oral, );) 1/22/2020  Wt:   193lbs    Current: 6/26/2020 8:31:01 AM    Ht:  5 ft, 2.75 in;  Wt: 197.4 lbs;  BMI: 35.2T: 97.5 F (oral);  BP: 142/69 mm Hg (right arm, sitting);  P: 85 bpm (right arm (BP Cuff), sitting);  sCr: 0.65 mg/dL;  GFR: 103.75        Repeat:     8:31:33 AM  BP:   140/65mm Hg (left arm, sitting, HR: 76)     Exams:     PHYSICAL EXAM:     GENERAL: well developed, well nourished;  no apparent distress;     RESPIRATORY: normal respiratory rate and pattern with no distress; normal breath sounds with no rales, rhonchi, wheezes or rubs;     CARDIOVASCULAR: normal rate; rhythm is regular;     BREAST/INTEGUMENT: SKIN: no significant rashes or lesions; no suspicious moles; small area of erythema under right breast, healing, no drainage;     MUSCULOSKELETAL: normal gait;     NEUROLOGIC: mental status: alert and oriented x 3; GROSSLY INTACT     PSYCHIATRIC: affect/demeanor: tearful;  normal psychomotor function; normal speech pattern; normal thought and perception;         Lab/Test Results:         Urine temperature: Confirmed (06/26/2020),     All urine drug screen levels confirmed negative: yes (06/26/2020),     Date and time of last pill: new script/AS (06/26/2020),     Performed by: ML (06/26/2020),     Collection Time: 0850 (06/26/2020),             Assessment:         F41.9   Anxiety  disorder, unspecified       S20.96XA   Insect bite (nonvenomous) of unspecified parts of thorax, initial encounter           ORDERS:         Meds Prescribed:       [New Rx] ALPRAZolam 0.25 mg oral tablet [take 1 tablet (0.25 mg) by oral route every 8 hours as needed], #30 (thirty) tablets, Refills: 0 (zero)         Radiology/Test Orders:       3017F  Colorectal CA screen results documented and reviewed (PV)  (In-House)              Lab Orders:       [id number]  Drug test prsmv qual dir optical obs per day  (In-House)            APPTO  Appointment need  (In-House)              Other Orders:         Screening mammogram results documented  (Send-Out)                      Plan:         Anxiety disorder, unspecifiedXanax for short term use. Potential side effects discussed.     LABORATORY:  Labs ordered to be performed today include Drug screen.  MIPS Vaccines Flu and Pneumonia updated in Shot record Screening mammomgram done within last 2 years and results in are chart Colorectal Cancer Screening is up to date and the results are in the chart     FOLLOW-UP: Schedule a follow-up visit in 1 month.:Patient will call to schedule follow-up appointment Controlled substance documentation: Jhon reviewed; drug screen performed and appropriate; consent is reviewed and signed and on the chart.  She is aware of risk of addiction on this medication, understands that she will need to follow up for a review every 3 months and her medications will be adjusted or decreased as deemed appropriate at each visit.  No history of drug or alcohol abuse.  No concerns about diversion or abuse. She denies side effects related to the medication.  She is aware that she may be called in for pill counts.  The dosing of this medication will be reviewed on a regular basis and reduced if possible..  Ongoing use of a controlled substance is necessary for this patient to have a normal quality of life           Prescriptions:       [New Rx] ALPRAZolam  0.25 mg oral tablet [take 1 tablet (0.25 mg) by oral route every 8 hours as needed], #30 (thirty) tablets, Refills: 0 (zero)           Orders:       [id number]  Drug test prsmv qual dir optical obs per day  (In-House)              Screening mammogram results documented  (Send-Out)            3017F  Colorectal CA screen results documented and reviewed (PV)  (In-House)            APPTO  Appointment need  (In-House)              Insect bite (nonvenomous) of unspecified parts of thorax, initial encounterDiscussed clean, healing. May take antihistamine such as allegra or bendryl for itching per package instructions. Monitor for worsening s/s such as uncontrolled fever, Bulls eye rash, joint pain and RTC as needed.             Patient Recommendations:        For  Anxiety disorder, unspecified:    Schedule a follow-up visit in 1 month.              Charge Capture:         Primary Diagnosis:     F41.9  Anxiety disorder, unspecified           Orders:      30439  Office/outpatient visit; established patient, level 4  (In-House)            53204  Drug test prsmv qual dir optical obs per day  (In-House)            3017F  Colorectal CA screen results documented and reviewed (PV)  (In-House)            APPTO  Appointment need  (In-House)              S20.96XA  Insect bite (nonvenomous) of unspecified parts of thorax, initial encounter

## 2021-05-18 NOTE — PROGRESS NOTES
Mavis Andrews  1960     Office/Outpatient Visit    Visit Date: Wed, Jan 22, 2020 03:49 pm    Provider: Shahid Cervantes MD (Assistant: Spurling, Sarah C, MA)    Location: [facility]        Electronically signed by Shahid Cervantes MD on  01/28/2020 08:24:04 PM                             Subjective:        CC: Ms. Andrews is a 59 year old White female.  prevenative exam;         HPI:           Encounter for general adult medical examination without abnormal findings noted.  Her last physical exam was 1 year ago.  She is not currently using any form of contraception.  She performs breast self-exams occasionally.  She is not current with her influenza immunization.  Tobacco: She has never smoked.            PHQ-9 Depression Screening: Completed form scanned and in chart; Total Score 2     ROS:     CONSTITUTIONAL:  Negative for chills, fatigue, fever, and weight change.      EYES:  Negative for blurred vision.      E/N/T:  Negative for ear pain and tinnitus.      CARDIOVASCULAR:  Negative for chest pain, dizziness, palpitations and edema.      RESPIRATORY:  Negative for dyspnea and cough.      GASTROINTESTINAL:  Negative for abdominal pain, diarrhea, heartburn, nausea and vomiting.      GENITOURINARY:  Negative for dysuria, hematuria and polyuria.      MUSCULOSKELETAL:  Negative for arthralgias and myalgias.      INTEGUMENTARY/BREAST:  Negative for rash, breast mass and skin changes of breast.      NEUROLOGICAL:  Negative for headaches, paresthesias and weakness.      ENDOCRINE:  Negative for hair loss, heat/cold intolerance, polydipsia, and polyphagia.      PSYCHIATRIC:  Negative for anxiety, depression, and sleep disturbances.          Past Medical History / Family History / Social History:         Last Reviewed on 1/28/2020 08:23 PM by Shahid Cervantes    Past Medical History:                 PAST MEDICAL HISTORY         Osteopenia         PREVENTIVE HEALTH MAINTENANCE             BONE DENSITY: was  last done 18 with the following abnormality noted-- Osteopenia     COLORECTAL CANCER SCREENING: Up to date (colonoscopy q10y; sigmoidoscopy q5y; Cologuard q3y) was last done 7/15/16, Results are in chart; colonoscopy with the following abnormalities noted-- Diverticulosis; The next colonoscopy is due  ; Dr. Hood     MAMMOGRAM: Done within last 2 years and results in are chart was last done 18 with normal results ARTHUR Knox     PAP SMEAR: was last done 2018 with normal results endometrial biopsy normal 2018         Surgical History:         rectocele;         Family History:     Father: ;  Coronary Artery Disease; Hypertension; CABG X 3     Mother: ;  Renal Failure ( on dialysis );  Type 2 Diabetes     Brother(s): Colon Cancer         Social History:     Occupation: [employer] shift     Marital Status:      Children: 1 child         Tobacco/Alcohol/Supplements:     Last Reviewed on 2020 08:23 PM by Shahid Cervantes    Tobacco: She has never smoked.          Alcohol: Frequency: Just on weekends When she drinks, the average quantity of alcohol is 1-2 drinks.          Substance Abuse History:     Last Reviewed on 2020 08:23 PM by Shahid Cervantes    None         Mental Health History:     Last Reviewed on 2020 08:23 PM by Shahid Cervantes    NEGATIVE         Communicable Diseases (eg STDs):     Last Reviewed on 2020 08:23 PM by Shahid Cervantes    Reportable health conditions; NEGATIVE         Current Problems:     Last Reviewed on 2020 08:23 PM by Shahid Cervantes    Encounter for screening for diabetes mellitus    Encounter for screening for lipoid disorders    Encounter for screening for depression    Encounter for general adult medical examination without abnormal findings        Immunizations:     Fluzone (3 + years dose) 2016    Fluzone (3 + years dose) 10/2/2017    Fluzone (3 + years dose) 10/1/2018    Adacel (Tdap) 2017    influenza,  "injectable, quadrivalent, preservative free 10/14/2019        Allergies:     Last Reviewed on 1/28/2020 08:23 PM by Shahid Cervantes    Penicillins:      Erythromycin Base:          Current Medications:     Last Reviewed on 1/28/2020 08:23 PM by Shahid Cervantes    BenadryL 25 mg oral capsule [1 tab daily PRN]    Tylenol Extra Strength 500 mg oral tablet [1 tab daily PRN]    diclofenac sodium 75 mg oral tablet, delayed release (enteric coated) [take 1 tablet (75 mg) by oral route 1 times per day]        Objective:        Vitals:         Current: 1/22/2020 3:57:11 PM    Ht:  5 ft, 2.75 in;  Wt: 193 lbs;  BMI: 34.5T: 98 F (oral);  BP: 132/72 mm Hg (right arm, sitting);  P: 76 bpm (right arm (BP Cuff), sitting);  sCr: 0.65 mg/dL;  GFR: 102.76        Exams:     PHYSICAL EXAM:     GENERAL: vital signs recorded - well developed, well nourished;  no apparent distress;     EYES: conjunctiva and cornea are normal;     E/N/T:  normal EACs, TMs, nasal/oral mucosa, teeth, gingiva, and oropharynx;     NECK: trachea is midline; thyroid is non-palpable;     RESPIRATORY: Clear to auscultation bilaterally; no rales (\"crackles\") present; no rhonchi; no wheezes;     CARDIOVASCULAR: normal rate; rhythm is regular;  No murmurs. clicks, gallops or rubs appreciated; no edema;     GASTROINTESTINAL: nontender; Soft and nondistended; normal bowel sounds; no organomegaly; no masses;     LYMPHATIC: no enlargement of cervical or facial nodes; no supraclavicular nodes;     BREAST/INTEGUMENT: No significant rashes, lesions or suspicious moles within limits of examination;     MUSCULOSKELETAL: muscle strength: 5/5 in all major muscle groups;  normal overall tone     NEUROLOGIC: Grossly intact; mental status: alert and oriented x 3;     PSYCHIATRIC: appropriate affect and demeanor; normal speech pattern; Normal behavior;         Assessment:         Z00.00   Encounter for general adult medical examination without abnormal findings       Z13.31   Encounter " for screening for depression           ORDERS:         Radiology/Test Orders:       3017F  Colorectal CA screen results documented and reviewed (PV)  (In-House)              Other Orders:         Depression screen negative  (In-House)              Screening mammogram results documented  (Send-Out)                      Plan:         Encounter for general adult medical examination without abnormal findings- Appropriate screenings and vaccinations reviewed with the patient and offered as indicated. Patient counseled on healthy lifestyle including balanced diet and adequate physical activity.     MIPS Vaccines Flu and Pneumonia updated in Shot record Screening mammomgram done within last 2 years and results in are chart Colorectal Cancer Screening is up to date and the results are in the chart           Orders:         Screening mammogram results documented  (Send-Out)            3017F  Colorectal CA screen results documented and reviewed (PV)  (In-House)              Encounter for screening for depression    [facility] PHQ-9 Depression Screening: Completed form scanned and in chart; Total Score 2; Negative Depression Screen           Orders:         Depression screen negative  (In-House)                  Charge Capture:         Primary Diagnosis:     Z00.00  Encounter for general adult medical examination without abnormal findings           Orders:      37924  Preventive medicine, established patient, age 40-64 years  (In-House)            3017F  Colorectal CA screen results documented and reviewed (PV)  (In-House)              Z13.31  Encounter for screening for depression           Orders:        Depression screen negative  (In-House)

## 2021-07-01 VITALS
TEMPERATURE: 97.5 F | WEIGHT: 193 LBS | HEIGHT: 63 IN | HEART RATE: 78 BPM | SYSTOLIC BLOOD PRESSURE: 136 MMHG | DIASTOLIC BLOOD PRESSURE: 80 MMHG | BODY MASS INDEX: 34.2 KG/M2

## 2021-07-02 VITALS
BODY MASS INDEX: 34.2 KG/M2 | SYSTOLIC BLOOD PRESSURE: 132 MMHG | HEIGHT: 63 IN | TEMPERATURE: 98 F | WEIGHT: 193 LBS | HEART RATE: 76 BPM | DIASTOLIC BLOOD PRESSURE: 72 MMHG

## 2021-07-02 VITALS
HEART RATE: 85 BPM | TEMPERATURE: 97.5 F | WEIGHT: 197.4 LBS | DIASTOLIC BLOOD PRESSURE: 65 MMHG | BODY MASS INDEX: 34.98 KG/M2 | HEIGHT: 63 IN | SYSTOLIC BLOOD PRESSURE: 140 MMHG

## 2023-05-22 NOTE — PROGRESS NOTES
"Chief Complaint  Establish Care (labs) and Annual Exam    Subjective          Mavis Andrews presents to [facility]  History of Present Illness  Patient is here to establish care.  It has been almost 3 years since she was last seen by our practice.    Last colonoscopy was last year; her brother was diagnosed with CRC and she gets her colonoscopy every 5 years. PAP through [facility]. Mammogram UTD and normal. Tdap UTD (2017). She does wear her seat belt. She does try to eat healthy on occasion. She does go to the dentist. She does drink water, but mostly diet cokes. She doesn't exercise regularly.       Objective   Vital Signs:   /84 (BP Location: Right arm, Patient Position: Sitting)   Pulse 71   Ht 159.4 cm (62.75\")   Wt 88.5 kg (195 lb)   BMI 34.82 kg/m²     Physical Exam  Constitutional:       General: She is not in acute distress.     Appearance: Normal appearance. She is well-developed. She is obese.   HENT:      Head: Normocephalic.      Right Ear: Tympanic membrane, ear canal and external ear normal.      Left Ear: Tympanic membrane, ear canal and external ear normal.      Mouth/Throat:      Mouth: Mucous membranes are moist.      Pharynx: Oropharynx is clear. No oropharyngeal exudate or posterior oropharyngeal erythema.   Eyes:      Extraocular Movements: Extraocular movements intact.      Conjunctiva/sclera: Conjunctivae normal.      Pupils: Pupils are equal, round, and reactive to light.      Visual Fields: Right eye visual fields normal and left eye visual fields normal.   Neck:      Thyroid: No thyromegaly or thyroid tenderness.   Cardiovascular:      Rate and Rhythm: Normal rate and regular rhythm.      Heart sounds: Normal heart sounds.   Pulmonary:      Effort: Pulmonary effort is normal. No retractions.      Breath sounds: Normal breath sounds and air entry.   Abdominal:      General: Abdomen is flat. Bowel sounds are normal. There is no distension.    "   Palpations: Abdomen is soft. There is no mass.      Tenderness: There is no abdominal tenderness.   Musculoskeletal:         General: Normal range of motion.      Cervical back: Normal range of motion and neck supple.      Right lower leg: No edema.      Left lower leg: No edema.   Lymphadenopathy:      Cervical: No cervical adenopathy.   Skin:     General: Skin is warm and dry.   Neurological:      Mental Status: She is alert and oriented to person, place, and time.      Motor: No weakness.      Gait: Gait normal.      Deep Tendon Reflexes: Reflexes normal.   Psychiatric:         Mood and Affect: Mood and affect normal.         Behavior: Behavior normal.         Thought Content: Thought content normal.        Result Review :   The following data was reviewed by: ARTHUR Naik on 05/31/2023:  MM digital mammo screen with jamia bilateral (04/19/2023 16:43)                  Assessment and Plan    Diagnoses and all orders for this visit:    1. Annual physical exam (Primary)  -     Cancel: CBC (No Diff); Future  -     Cancel: Comprehensive Metabolic Panel; Future  -     Hemoglobin A1c; Future  -     TSH; Future  -     Lipid Panel; Future  -     Hepatitis C Antibody; Future  -     CBC (No Diff); Future  -     Comprehensive Metabolic Panel; Future    2. Preventative health care  -     Cancel: CBC (No Diff); Future  -     Cancel: Comprehensive Metabolic Panel; Future  -     Hemoglobin A1c; Future  -     TSH; Future  -     Lipid Panel; Future  -     Hepatitis C Antibody; Future  -     CBC (No Diff); Future  -     Comprehensive Metabolic Panel; Future    3. Screening for thyroid disorder  -     TSH; Future    4. Screening for diabetes mellitus (DM)  -     Hemoglobin A1c; Future    5. Screening for cholesterol level  -     Lipid Panel; Future    6. Need for hepatitis C screening test  -     Hepatitis C Antibody; Future    7. Elevated BP without diagnosis of hypertension    Preventative measures discussed.  She is  up-to-date on most of her preventative measures.  She does get a colonoscopy every 5 years due to family history of colon cancer in her brother.  We have discussed the Shingrix vaccine.  She is not interested in the COVID-vaccine at this time.  Her Pap smear is through her gynecologist.  I do recommend that she get a blood pressure cuff and check her blood pressure at home and see what her readings are compared in the office setting.      Follow Up   Return in about 1 year (around 5/31/2024) for Annual physical, Pending test results.  Patient was given instructions and counseling regarding her condition or for health maintenance advice. Please see specific information pulled into the AVS if appropriate.

## 2023-05-31 ENCOUNTER — OFFICE VISIT (OUTPATIENT)
Dept: FAMILY MEDICINE CLINIC | Age: 63
End: 2023-05-31

## 2023-05-31 VITALS
HEIGHT: 63 IN | SYSTOLIC BLOOD PRESSURE: 144 MMHG | WEIGHT: 195 LBS | HEART RATE: 71 BPM | DIASTOLIC BLOOD PRESSURE: 84 MMHG | BODY MASS INDEX: 34.55 KG/M2

## 2023-05-31 DIAGNOSIS — Z00.00 ANNUAL PHYSICAL EXAM: Primary | ICD-10-CM

## 2023-05-31 DIAGNOSIS — Z13.220 SCREENING FOR CHOLESTEROL LEVEL: ICD-10-CM

## 2023-05-31 DIAGNOSIS — Z13.29 SCREENING FOR THYROID DISORDER: ICD-10-CM

## 2023-05-31 DIAGNOSIS — Z13.1 SCREENING FOR DIABETES MELLITUS (DM): ICD-10-CM

## 2023-05-31 DIAGNOSIS — Z11.59 NEED FOR HEPATITIS C SCREENING TEST: ICD-10-CM

## 2023-05-31 DIAGNOSIS — R03.0 ELEVATED BP WITHOUT DIAGNOSIS OF HYPERTENSION: ICD-10-CM

## 2023-05-31 DIAGNOSIS — Z00.00 PREVENTATIVE HEALTH CARE: ICD-10-CM

## 2023-05-31 PROCEDURE — 99396 PREV VISIT EST AGE 40-64: CPT | Performed by: NURSE PRACTITIONER

## 2023-05-31 RX ORDER — DICLOFENAC SODIUM 75 MG/1
1 TABLET, DELAYED RELEASE ORAL EVERY 12 HOURS SCHEDULED
COMMUNITY
Start: 2023-05-11

## 2023-05-31 RX ORDER — MELOXICAM 15 MG/1
1 TABLET ORAL DAILY
COMMUNITY
Start: 2023-05-02 | End: 2023-05-31

## 2023-06-07 ENCOUNTER — LAB (OUTPATIENT)
Dept: LAB | Facility: HOSPITAL | Age: 63
End: 2023-06-07
Payer: COMMERCIAL

## 2023-06-07 DIAGNOSIS — Z00.00 PREVENTATIVE HEALTH CARE: ICD-10-CM

## 2023-06-07 DIAGNOSIS — Z13.1 SCREENING FOR DIABETES MELLITUS (DM): ICD-10-CM

## 2023-06-07 DIAGNOSIS — Z11.59 NEED FOR HEPATITIS C SCREENING TEST: ICD-10-CM

## 2023-06-07 DIAGNOSIS — Z13.220 SCREENING FOR CHOLESTEROL LEVEL: ICD-10-CM

## 2023-06-07 DIAGNOSIS — Z13.29 SCREENING FOR THYROID DISORDER: ICD-10-CM

## 2023-06-07 DIAGNOSIS — Z00.00 ANNUAL PHYSICAL EXAM: ICD-10-CM

## 2023-06-07 LAB
ALBUMIN SERPL-MCNC: 4.6 G/DL (ref 3.5–5.2)
ALBUMIN/GLOB SERPL: 1.7 G/DL
ALP SERPL-CCNC: 101 U/L (ref 39–117)
ALT SERPL W P-5'-P-CCNC: 14 U/L (ref 1–33)
ANION GAP SERPL CALCULATED.3IONS-SCNC: 9.1 MMOL/L (ref 5–15)
AST SERPL-CCNC: 20 U/L (ref 1–32)
BILIRUB SERPL-MCNC: 0.4 MG/DL (ref 0–1.2)
BUN SERPL-MCNC: 14 MG/DL (ref 8–23)
BUN/CREAT SERPL: 19.7 (ref 7–25)
CALCIUM SPEC-SCNC: 9.5 MG/DL (ref 8.6–10.5)
CHLORIDE SERPL-SCNC: 104 MMOL/L (ref 98–107)
CHOLEST SERPL-MCNC: 192 MG/DL (ref 0–200)
CO2 SERPL-SCNC: 26.9 MMOL/L (ref 22–29)
CREAT SERPL-MCNC: 0.71 MG/DL (ref 0.57–1)
DEPRECATED RDW RBC AUTO: 41.9 FL (ref 37–54)
EGFRCR SERPLBLD CKD-EPI 2021: 96.9 ML/MIN/1.73
ERYTHROCYTE [DISTWIDTH] IN BLOOD BY AUTOMATED COUNT: 12.8 % (ref 12.3–15.4)
GLOBULIN UR ELPH-MCNC: 2.7 GM/DL
GLUCOSE SERPL-MCNC: 101 MG/DL (ref 65–99)
HBA1C MFR BLD: 5.7 % (ref 4.8–5.6)
HCT VFR BLD AUTO: 42.8 % (ref 34–46.6)
HCV AB SER DONR QL: NORMAL
HDLC SERPL-MCNC: 57 MG/DL (ref 40–60)
HGB BLD-MCNC: 13.9 G/DL (ref 12–15.9)
LDLC SERPL CALC-MCNC: 123 MG/DL (ref 0–100)
LDLC/HDLC SERPL: 2.13 {RATIO}
MCH RBC QN AUTO: 28.7 PG (ref 26.6–33)
MCHC RBC AUTO-ENTMCNC: 32.5 G/DL (ref 31.5–35.7)
MCV RBC AUTO: 88.2 FL (ref 79–97)
PLATELET # BLD AUTO: 322 10*3/MM3 (ref 140–450)
PMV BLD AUTO: 8.9 FL (ref 6–12)
POTASSIUM SERPL-SCNC: 4 MMOL/L (ref 3.5–5.2)
PROT SERPL-MCNC: 7.3 G/DL (ref 6–8.5)
RBC # BLD AUTO: 4.85 10*6/MM3 (ref 3.77–5.28)
SODIUM SERPL-SCNC: 140 MMOL/L (ref 136–145)
TRIGL SERPL-MCNC: 67 MG/DL (ref 0–150)
TSH SERPL DL<=0.05 MIU/L-ACNC: 1.12 UIU/ML (ref 0.27–4.2)
VLDLC SERPL-MCNC: 12 MG/DL (ref 5–40)
WBC NRBC COR # BLD: 6.52 10*3/MM3 (ref 3.4–10.8)

## 2023-06-07 PROCEDURE — 36415 COLL VENOUS BLD VENIPUNCTURE: CPT

## 2023-06-07 PROCEDURE — 83036 HEMOGLOBIN GLYCOSYLATED A1C: CPT

## 2023-06-07 PROCEDURE — 80050 GENERAL HEALTH PANEL: CPT

## 2023-06-07 PROCEDURE — 86803 HEPATITIS C AB TEST: CPT

## 2023-06-07 PROCEDURE — 80061 LIPID PANEL: CPT

## 2025-02-25 ENCOUNTER — OFFICE VISIT (OUTPATIENT)
Dept: FAMILY MEDICINE CLINIC | Age: 65
End: 2025-02-25
Payer: COMMERCIAL

## 2025-02-25 VITALS
WEIGHT: 195.6 LBS | TEMPERATURE: 98.4 F | DIASTOLIC BLOOD PRESSURE: 89 MMHG | SYSTOLIC BLOOD PRESSURE: 146 MMHG | OXYGEN SATURATION: 98 % | HEIGHT: 63 IN | BODY MASS INDEX: 34.66 KG/M2 | HEART RATE: 79 BPM

## 2025-02-25 DIAGNOSIS — U07.1 COVID-19: Primary | ICD-10-CM

## 2025-02-25 DIAGNOSIS — J02.9 SORE THROAT: ICD-10-CM

## 2025-02-25 DIAGNOSIS — R05.1 ACUTE COUGH: ICD-10-CM

## 2025-02-25 DIAGNOSIS — R03.0 ELEVATED BLOOD PRESSURE READING: ICD-10-CM

## 2025-02-25 LAB
EXPIRATION DATE: ABNORMAL
EXPIRATION DATE: NORMAL
FLUAV AG UPPER RESP QL IA.RAPID: NOT DETECTED
FLUBV AG UPPER RESP QL IA.RAPID: NOT DETECTED
INTERNAL CONTROL: ABNORMAL
INTERNAL CONTROL: NORMAL
Lab: ABNORMAL
Lab: NORMAL
S PYO AG THROAT QL: NEGATIVE
SARS-COV-2 AG UPPER RESP QL IA.RAPID: DETECTED

## 2025-02-25 PROCEDURE — 87428 SARSCOV & INF VIR A&B AG IA: CPT

## 2025-02-25 PROCEDURE — 87880 STREP A ASSAY W/OPTIC: CPT

## 2025-02-25 PROCEDURE — 87081 CULTURE SCREEN ONLY: CPT

## 2025-02-25 PROCEDURE — 99214 OFFICE O/P EST MOD 30 MIN: CPT

## 2025-02-25 RX ORDER — DEXTROMETHORPHAN HYDROBROMIDE AND PROMETHAZINE HYDROCHLORIDE 15; 6.25 MG/5ML; MG/5ML
5 SYRUP ORAL NIGHTLY PRN
Qty: 118 ML | Refills: 0 | Status: SHIPPED | OUTPATIENT
Start: 2025-02-25

## 2025-02-25 RX ORDER — CYCLOSPORINE 0.5 MG/ML
EMULSION OPHTHALMIC
COMMUNITY
Start: 2024-12-21

## 2025-02-25 RX ORDER — MELOXICAM 15 MG/1
1 TABLET ORAL DAILY
COMMUNITY
Start: 2024-11-12 | End: 2025-02-25

## 2025-02-25 RX ORDER — BENZONATATE 100 MG/1
100 CAPSULE ORAL 3 TIMES DAILY PRN
Qty: 21 CAPSULE | Refills: 0 | Status: SHIPPED | OUTPATIENT
Start: 2025-02-25

## 2025-02-25 NOTE — PROGRESS NOTES
Subjective     CHIEF COMPLAINT    Chief Complaint   Patient presents with    Cough     X 3 days.     Nasal Congestion    Sore Throat     Patient or patient representative verbalized consent for the use of Ambient Listening during the visit with  ARTHUR Ramirez for chart documentation. 2/25/2025  12:03 EST  History of Present Illness  The patient is a 64-year-old female who presents for evaluation of congestion and coughing.    She has been experiencing symptoms of congestion and coughing since Saturday, which she initially believed would improve. However, despite self-medicating with over-the-counter medications such as Mucinex, DayQuil, NyQuil, and phenylephrine, her condition has not improved. She reports exposure to sick individuals at her workplace and has been absent from work for the past two days. Her symptoms began with a fever on Saturday, accompanied by chills. She also reports experiencing headaches and mild body aches. Her cough is minimally productive and disrupts her sleep. She does not smoke and has no history of asthma. She also reports throat discomfort due to the persistent cough. She has a known history of allergies and manages them with over-the-counter Benadryl. She does not use any nasal sprays.    Review of Systems   Constitutional:  Positive for chills and fever (subjective).   HENT:  Positive for congestion and sore throat. Negative for rhinorrhea.    Respiratory:  Positive for cough. Negative for shortness of breath and wheezing.    Cardiovascular:  Negative for chest pain.   Gastrointestinal:  Negative for nausea and vomiting.   Musculoskeletal:  Positive for myalgias.   Neurological:  Positive for headaches.     Past Medical History:   Diagnosis Date    Anxiety          Past Surgical History:   Procedure Laterality Date    COLONOSCOPY      RECTOCELE REPAIR           Family History   Problem Relation Age of Onset    Kidney disease Mother     Diabetes Mother     Heart disease Father   "   Hypertension Father     Colon cancer Brother          Social History     Socioeconomic History    Marital status:    Tobacco Use    Smoking status: Never    Smokeless tobacco: Never   Vaping Use    Vaping status: Never Used   Substance and Sexual Activity    Alcohol use: Yes     Comment: occ    Drug use: No    Sexual activity: Never         Allergies   Allergen Reactions    Erythromycin Nausea Only    Penicillins Hives          Current Outpatient Medications on File Prior to Visit   Medication Sig Dispense Refill    diclofenac (VOLTAREN) 75 MG EC tablet Take 1 tablet by mouth Every 12 (Twelve) Hours.      Restasis 0.05 % ophthalmic emulsion       [DISCONTINUED] diphenhydrAMINE (SOMINEX) 25 MG tablet Take 1 tablet by mouth. (Patient not taking: Reported on 2/25/2025)      [DISCONTINUED] meloxicam (MOBIC) 15 MG tablet Take 1 tablet by mouth Daily. (Patient not taking: Reported on 2/25/2025)       No current facility-administered medications on file prior to visit.       /89   Pulse 79   Temp 98.4 °F (36.9 °C) (Oral)   Ht 159.4 cm (62.76\")   Wt 88.7 kg (195 lb 9.6 oz)   SpO2 98% Comment: room air  BMI 34.92 kg/m²       Objective     Physical Exam  Vitals and nursing note reviewed.   Constitutional:       General: She is not in acute distress.     Appearance: Normal appearance. She is not ill-appearing.   HENT:      Head: Normocephalic.      Right Ear: Tympanic membrane, ear canal and external ear normal.      Left Ear: Tympanic membrane, ear canal and external ear normal.      Nose: Nose normal.      Right Sinus: No maxillary sinus tenderness or frontal sinus tenderness.      Left Sinus: No maxillary sinus tenderness or frontal sinus tenderness.      Mouth/Throat:      Lips: Pink.      Mouth: Mucous membranes are moist.      Pharynx: Oropharynx is clear. Uvula midline. No pharyngeal swelling, oropharyngeal exudate, posterior oropharyngeal erythema or uvula swelling.   Eyes:      Pupils: Pupils " are equal, round, and reactive to light.   Cardiovascular:      Rate and Rhythm: Normal rate and regular rhythm.      Heart sounds: Normal heart sounds. No murmur heard.  Pulmonary:      Effort: Pulmonary effort is normal. No accessory muscle usage or respiratory distress.      Breath sounds: Normal breath sounds. No wheezing or rhonchi.   Musculoskeletal:      Cervical back: Normal range of motion.   Lymphadenopathy:      Cervical: No cervical adenopathy.   Skin:     General: Skin is warm and dry.   Neurological:      General: No focal deficit present.      Mental Status: She is alert and oriented to person, place, and time.   Psychiatric:         Mood and Affect: Mood and affect normal.         Behavior: Behavior normal.       Lab Results (last 24 hours)       Procedure Component Value Units Date/Time    POCT rapid strep A [[id number]] Collected: 02/25/25 1202    Specimen: Swab Updated: 02/25/25 1202     Rapid Strep A Screen Negative     Internal Control Passed     Lot Number [id number]     Expiration Date 11-    POCT SARS-CoV-2 Antigen TERRY + Flu [[id number]]  (Abnormal) Collected: 02/25/25 1207    Specimen: Swab Updated: 02/25/25 1208     SARS Antigen Detected     Influenza A Antigen TERRY Not Detected     Influenza B Antigen TERRY Not Detected     Internal Control Passed     Lot Number [id number]     Expiration Date 7-          Assessment & Plan  COVID-19  Patient positive for COVID on rapid testing today.  She is negative for flu and strep.  Strep culture sent and pending.  Will treat accordingly.  Discussed Paxlovid including risks, benefit and side effects.  Patient declines Paxlovid.  Discussed symptomatic treatment including rest, increase fluid intake, over-the-counter analgesics.  Promethazine DM sent to pharmacy to use at night and Tessalon Perles to use during the day.  Patient made aware Promethazine DM may make her drowsy.  Isolation guidelines per CDC discussed.  Return and ER precautions  advised.  Orders:    promethazine-dextromethorphan (PROMETHAZINE-DM) 6.25-15 MG/5ML syrup; Take 5 mL by mouth At Night As Needed for Cough.    benzonatate (Tessalon Perles) 100 MG capsule; Take 1 capsule by mouth 3 (Three) Times a Day As Needed for Cough.    Sore throat    Orders:    POCT SARS-CoV-2 Antigen TERRY + Flu    POCT rapid strep A    Beta Strep Culture, Throat - , Throat; Future    Beta Strep Culture, Throat - Swab, Throat    Acute cough    Orders:    POCT SARS-CoV-2 Antigen TERRY + Flu    promethazine-dextromethorphan (PROMETHAZINE-DM) 6.25-15 MG/5ML syrup; Take 5 mL by mouth At Night As Needed for Cough.    benzonatate (Tessalon Perles) 100 MG capsule; Take 1 capsule by mouth 3 (Three) Times a Day As Needed for Cough.    Elevated blood pressure reading  Possibly due to use of decongestants and acute illness.  Recommend monitoring at home.  Follow-up with PCP if remains elevated         Follow up:  Return if symptoms worsen or fail to improve.  Patient was given instructions and counseling regarding her condition or for health maintenance advice. Please see specific information pulled into the AVS if appropriate.

## 2025-02-25 NOTE — LETTER
February 25, 2025     Patient: Mavis Andrews   YOB: 1960   Date of Visit: 2/25/2025       To Whom It May Concern:    It is my medical opinion that Mavis Andrews may return on 2/27/25         Sincerely,        ARTHUR Ramirez

## 2025-02-26 ENCOUNTER — TELEPHONE (OUTPATIENT)
Dept: FAMILY MEDICINE CLINIC | Age: 65
End: 2025-02-26
Payer: COMMERCIAL

## 2025-02-26 NOTE — TELEPHONE ENCOUNTER
Caller: Mavis Andrews    Relationship to patient: Self    Best call back number: [phone]    Patient is needing: PATIENT CALLED IN AND SAID SHE IS STILL NOT FEELING WELL AND WOULD LIKE TO HAVE A NOTE FOR WORK THAT IS EXTENDED UNTIL 3/3/2025. PATIENT IS REQUESTING A CALL BACK PLEASE.

## 2025-02-27 LAB — BACTERIA SPEC AEROBE CULT: NORMAL

## 2025-06-20 ENCOUNTER — OFFICE VISIT (OUTPATIENT)
Dept: FAMILY MEDICINE CLINIC | Age: 65
End: 2025-06-20
Payer: COMMERCIAL

## 2025-06-20 ENCOUNTER — LAB (OUTPATIENT)
Dept: LAB | Facility: HOSPITAL | Age: 65
End: 2025-06-20
Payer: COMMERCIAL

## 2025-06-20 VITALS
SYSTOLIC BLOOD PRESSURE: 140 MMHG | BODY MASS INDEX: 33.77 KG/M2 | TEMPERATURE: 98.4 F | DIASTOLIC BLOOD PRESSURE: 85 MMHG | OXYGEN SATURATION: 100 % | HEIGHT: 63 IN | HEART RATE: 69 BPM | WEIGHT: 190.6 LBS

## 2025-06-20 DIAGNOSIS — Z13.29 SCREENING FOR THYROID DISORDER: ICD-10-CM

## 2025-06-20 DIAGNOSIS — Z13.1 SCREENING FOR DIABETES MELLITUS: ICD-10-CM

## 2025-06-20 DIAGNOSIS — Z00.00 ANNUAL PHYSICAL EXAM: Primary | ICD-10-CM

## 2025-06-20 DIAGNOSIS — E78.00 ELEVATED CHOLESTEROL: ICD-10-CM

## 2025-06-20 PROBLEM — Z86.0100 HISTORY OF COLON POLYPS: Status: ACTIVE | Noted: 2025-06-20

## 2025-06-20 PROBLEM — Z28.20 VACCINE REFUSED BY PATIENT: Status: ACTIVE | Noted: 2025-06-20

## 2025-06-20 LAB
ALBUMIN SERPL-MCNC: 4.4 G/DL (ref 3.5–5.2)
ALBUMIN/GLOB SERPL: 1.6 G/DL
ALP SERPL-CCNC: 102 U/L (ref 39–117)
ALT SERPL W P-5'-P-CCNC: 11 U/L (ref 1–33)
ANION GAP SERPL CALCULATED.3IONS-SCNC: 9.4 MMOL/L (ref 5–15)
AST SERPL-CCNC: 19 U/L (ref 1–32)
BILIRUB SERPL-MCNC: 0.6 MG/DL (ref 0–1.2)
BUN SERPL-MCNC: 10 MG/DL (ref 8–23)
BUN/CREAT SERPL: 14.1 (ref 7–25)
CALCIUM SPEC-SCNC: 9.1 MG/DL (ref 8.6–10.5)
CHLORIDE SERPL-SCNC: 104 MMOL/L (ref 98–107)
CHOLEST SERPL-MCNC: 204 MG/DL (ref 0–200)
CO2 SERPL-SCNC: 25.6 MMOL/L (ref 22–29)
CREAT SERPL-MCNC: 0.71 MG/DL (ref 0.57–1)
DEPRECATED RDW RBC AUTO: 43.9 FL (ref 37–54)
EGFRCR SERPLBLD CKD-EPI 2021: 95.7 ML/MIN/1.73
ERYTHROCYTE [DISTWIDTH] IN BLOOD BY AUTOMATED COUNT: 13.1 % (ref 12.3–15.4)
GLOBULIN UR ELPH-MCNC: 2.8 GM/DL
GLUCOSE SERPL-MCNC: 108 MG/DL (ref 65–99)
HBA1C MFR BLD: 5.6 % (ref 4.8–5.6)
HCT VFR BLD AUTO: 45.3 % (ref 34–46.6)
HDLC SERPL-MCNC: 64 MG/DL (ref 40–60)
HGB BLD-MCNC: 14.3 G/DL (ref 12–15.9)
LDLC SERPL CALC-MCNC: 132 MG/DL (ref 0–100)
LDLC/HDLC SERPL: 2.04 {RATIO}
MCH RBC QN AUTO: 28.9 PG (ref 26.6–33)
MCHC RBC AUTO-ENTMCNC: 31.6 G/DL (ref 31.5–35.7)
MCV RBC AUTO: 91.5 FL (ref 79–97)
PLATELET # BLD AUTO: 329 10*3/MM3 (ref 140–450)
PMV BLD AUTO: 8.8 FL (ref 6–12)
POTASSIUM SERPL-SCNC: 4.3 MMOL/L (ref 3.5–5.2)
PROT SERPL-MCNC: 7.2 G/DL (ref 6–8.5)
RBC # BLD AUTO: 4.95 10*6/MM3 (ref 3.77–5.28)
SODIUM SERPL-SCNC: 139 MMOL/L (ref 136–145)
TRIGL SERPL-MCNC: 46 MG/DL (ref 0–150)
TSH SERPL DL<=0.05 MIU/L-ACNC: 0.88 UIU/ML (ref 0.27–4.2)
VLDLC SERPL-MCNC: 8 MG/DL (ref 5–40)
WBC NRBC COR # BLD AUTO: 4.7 10*3/MM3 (ref 3.4–10.8)

## 2025-06-20 PROCEDURE — 80050 GENERAL HEALTH PANEL: CPT | Performed by: FAMILY MEDICINE

## 2025-06-20 PROCEDURE — 36415 COLL VENOUS BLD VENIPUNCTURE: CPT | Performed by: FAMILY MEDICINE

## 2025-06-20 PROCEDURE — 83036 HEMOGLOBIN GLYCOSYLATED A1C: CPT | Performed by: FAMILY MEDICINE

## 2025-06-20 PROCEDURE — 80061 LIPID PANEL: CPT | Performed by: FAMILY MEDICINE

## 2025-06-20 PROCEDURE — 99396 PREV VISIT EST AGE 40-64: CPT | Performed by: FAMILY MEDICINE

## 2025-06-20 RX ORDER — DIPHENHYDRAMINE HCL 25 MG
50 CAPSULE ORAL DAILY
COMMUNITY

## 2025-06-20 NOTE — PROGRESS NOTES
Chief Complaint  Annual Exam    Abdias Andrews presents to [facility]  History of Present Illness  --ANNUAL EXAM, LAST EXAM WAS ONE YEAR AGO, DOES NOT SMOKE, COLONOSCOPY WAS IN 2022  --LAST CHOL WAS > 190 WITH AN LDL > 120  --DUE FOR SOME ROUTINE LABS AND A ZOSTER VACCINE        Allergies   Allergen Reactions    Erythromycin Nausea Only    Penicillins Hives        Health Maintenance Due   Topic Date Due    ANNUAL PHYSICAL  05/31/2024    LIPID PANEL  06/07/2024    COVID-19 Vaccine (3 - 2024-25 season) 09/01/2024        Current Outpatient Medications on File Prior to Visit   Medication Sig    diclofenac (VOLTAREN) 75 MG EC tablet Take 1 tablet by mouth Every 12 (Twelve) Hours.    diphenhydrAMINE (BENADRYL) 25 mg capsule Take 2 capsules by mouth Daily.    Restasis 0.05 % ophthalmic emulsion     [DISCONTINUED] benzonatate (Tessalon Perles) 100 MG capsule Take 1 capsule by mouth 3 (Three) Times a Day As Needed for Cough.    [DISCONTINUED] promethazine-dextromethorphan (PROMETHAZINE-DM) 6.25-15 MG/5ML syrup Take 5 mL by mouth At Night As Needed for Cough.     No current facility-administered medications on file prior to visit.       Immunization History   Administered Date(s) Administered    COVID-19 (PFIZER) Purple Cap Monovalent 05/25/2021, 06/15/2021    Fluzone  >6mos 10/14/2024    Influenza Injectable Mdck Pf Quad 10/25/2023    Influenza Seasonal Injectable 09/28/2016, 10/02/2017    Tdap 12/27/2017       Review of Systems   Constitutional:  Negative for activity change, appetite change, chills, fatigue and fever.   HENT:  Negative for congestion, ear pain, hearing loss, rhinorrhea and sore throat.    Eyes:  Negative for blurred vision and discharge.   Respiratory:  Negative for cough and shortness of breath.    Cardiovascular:  Negative for chest pain, palpitations and leg swelling.   Gastrointestinal:  Negative for abdominal pain, constipation, diarrhea, nausea  "and vomiting.   Genitourinary:  Negative for dysuria and hematuria.   Musculoskeletal:  Negative for arthralgias and myalgias.   Neurological:  Negative for headache.   Psychiatric/Behavioral:  Negative for depressed mood.         Objective     /85 (BP Location: Left arm, Patient Position: Sitting)   Pulse 69   Temp 98.4 °F (36.9 °C) (Oral)   Ht 159.4 cm (62.75\")   Wt 86.5 kg (190 lb 9.6 oz)   SpO2 100% Comment: on RA  BMI 34.03 kg/m²       Physical Exam  Vitals and nursing note reviewed.   Constitutional:       General: She is not in acute distress.     Appearance: Normal appearance.   HENT:      Right Ear: Tympanic membrane normal.      Left Ear: Tympanic membrane normal.      Mouth/Throat:      Pharynx: Oropharynx is clear.   Eyes:      Conjunctiva/sclera: Conjunctivae normal.   Cardiovascular:      Rate and Rhythm: Normal rate and regular rhythm.      Heart sounds: Normal heart sounds. No murmur heard.  Pulmonary:      Effort: Pulmonary effort is normal.      Breath sounds: Normal breath sounds.   Abdominal:      General: Bowel sounds are normal.      Palpations: Abdomen is soft.      Tenderness: There is no abdominal tenderness.   Musculoskeletal:      Cervical back: Neck supple.      Right lower leg: No edema.      Left lower leg: No edema.   Lymphadenopathy:      Cervical: No cervical adenopathy.   Neurological:      General: No focal deficit present.      Mental Status: She is alert.      Cranial Nerves: No cranial nerve deficit.      Coordination: Coordination normal.      Gait: Gait normal.   Psychiatric:         Mood and Affect: Mood normal.         Behavior: Behavior normal.         Result Review :                             Assessment and Plan      Diagnoses and all orders for this visit:    1. Annual physical exam (Primary)  Assessment & Plan:  ADVICE GIVEN RE:  SEATBELT USE, ALCOHOL USE, HEALTHY DIET, ROUTINE EYE AND DENTAL EXAM, ROUTINE VACCINATIONS.    DECLINES A ZOSTER VACCINE       2. " Elevated cholesterol  -     Comprehensive Metabolic Panel  -     Lipid Panel    3. Screening for diabetes mellitus  -     CBC (No Diff)  -     Hemoglobin A1c    4. Screening for thyroid disorder  -     TSH Rfx On Abnormal To Free T4        BMI is >= 30 and <35. (Class 1 Obesity). The following options were offered after discussion;: nutrition counseling/recommendations           Follow Up     Return in about 1 year (around 6/20/2026).    Patient was given instructions and counseling regarding her condition or for health maintenance advice. Please see specific information pulled into the AVS if appropriate.

## 2025-06-20 NOTE — ASSESSMENT & PLAN NOTE
ADVICE GIVEN RE:  SEATBELT USE, ALCOHOL USE, HEALTHY DIET, ROUTINE EYE AND DENTAL EXAM, ROUTINE VACCINATIONS.    DECLINES A ZOSTER VACCINE

## 2025-06-24 ENCOUNTER — RESULTS FOLLOW-UP (OUTPATIENT)
Dept: FAMILY MEDICINE CLINIC | Age: 65
End: 2025-06-24
Payer: COMMERCIAL

## 2025-06-24 ENCOUNTER — DOCUMENTATION (OUTPATIENT)
Dept: FAMILY MEDICINE CLINIC | Age: 65
End: 2025-06-24
Payer: COMMERCIAL

## 2025-06-24 DIAGNOSIS — E78.00 HIGH CHOLESTEROL: Primary | ICD-10-CM

## 2025-06-24 RX ORDER — ATORVASTATIN CALCIUM 10 MG/1
10 TABLET, FILM COATED ORAL NIGHTLY
Qty: 90 TABLET | Refills: 1 | Status: SHIPPED | OUTPATIENT
Start: 2025-06-24 | End: 2025-12-21

## 2025-07-01 ENCOUNTER — TELEPHONE (OUTPATIENT)
Dept: FAMILY MEDICINE CLINIC | Age: 65
End: 2025-07-01
Payer: COMMERCIAL

## 2025-07-01 NOTE — TELEPHONE ENCOUNTER
Hold the medicine. Thencall her back next week to make sure she recovered, then we can consider whether or not to restart the medication or give her something different.

## 2025-07-01 NOTE — TELEPHONE ENCOUNTER
Pt said she Dr Ramirez started her on Lipitor and she started it on 06/25/25.  She has taken 6 pills and now she has a patchy rash on both lower legs below the knee.  No itching, no soa, no swelling of face, tongue or lips.  Do not take any more until we talk to Dr Ramirez.  Go to ER if soa, blue lips or face or tongue swelling.  She said okay.

## 2025-07-02 NOTE — TELEPHONE ENCOUNTER
Pt informed. Will set a reminder to check back with her in 1 week.  Advised her to call back sooner if she gets worse or has any issues.

## 2025-07-16 ENCOUNTER — DOCUMENTATION (OUTPATIENT)
Dept: FAMILY MEDICINE CLINIC | Age: 65
End: 2025-07-16
Payer: COMMERCIAL

## 2025-07-16 RX ORDER — PRAVASTATIN SODIUM 20 MG
20 TABLET ORAL NIGHTLY
Qty: 90 TABLET | Refills: 1 | Status: SHIPPED | OUTPATIENT
Start: 2025-07-16 | End: 2026-01-12